# Patient Record
(demographics unavailable — no encounter records)

---

## 2024-10-18 NOTE — PHYSICAL EXAM
[JVD] : no jugular venous distention  [Normal Breath Sounds] : Normal breath sounds [Normal Heart Sounds] : normal heart sounds [Calm] : calm [de-identified] : soft [de-identified] : L shin wound 4 x 4 cm

## 2024-10-18 NOTE — REVIEW OF SYSTEMS
[Fever] : no fever [Chills] : no chills [Chest Pain] : no chest pain [Shortness Of Breath] : no shortness of breath [Abdominal Pain] : no abdominal pain [Arthralgias] : arthralgias [Skin Wound] : skin wound [Convulsions] : no convulsions [Easy Bleeding] : no tendency for easy bleeding [Easy Bruising] : no tendency for easy bruising

## 2024-10-18 NOTE — REASON FOR VISIT
[Consultation] : a consultation visit [Family Member] : family member [FreeTextEntry1] : L shin wound

## 2024-10-25 NOTE — PHYSICAL EXAM
[Normal Breath Sounds] : Normal breath sounds [Normal Heart Sounds] : normal heart sounds [Calm] : calm [de-identified] : soft [de-identified] : L shin: wound is less deep

## 2024-11-11 NOTE — PHYSICAL EXAM
[Normal Breath Sounds] : Normal breath sounds [Normal Heart Sounds] : normal heart sounds [Calm] : calm [de-identified] : soft [de-identified] : L shin skin - otherwise healed.

## 2024-11-11 NOTE — HISTORY OF PRESENT ILLNESS
[de-identified] : Was hospitalized last night. Comes in today from home. Had ddsg on L shin - pulled off healed skin.

## 2024-12-04 NOTE — HISTORY OF PRESENT ILLNESS
[FreeTextEntry1] : The patient is an 86-year-old female presenting for up today.  The patient has a history of paroxysmal atrial fibrillation, CVA s/p ILR, CAD s/p PCI (2016), HTN, HLD, DM, CKD, bi-cytopenia (anemia and thrombocytopenia), and syncope.   To summarize, the patient first had an ILR implanted in 2019 for CVA. This showed AF during follow up and she was started on Eliquis. Given concerns about bleeding, this was later stopped, and she was advised to consider CELY closure which she declined. On 5/12/23 she underwent ILR explant and implant of new MDT ILR device for long term monitoring. She was taken off AC in the past due to bleeding concerns (falls, anemia, thrombocytopenia and CKD). She declined CELY closure in the past.   Today, the patient reports to be doing well. The patient denies any symptoms of palpitations, shortness of breath, dizziness, chest pain, syncope or extremity edema. No AF is seen on remote monitoring of the ILR.

## 2024-12-04 NOTE — DISCUSSION/SUMMARY
[FreeTextEntry1] : In summary, the patient is an 86-year-old female with a history of CVA s/p ILR, CAD, HTN, DM, bicytopenia and very low burden paroxysmal atrial fibrillation. She is off oral AC because of expected high risk of bleeding (falls, bicytopenia). She has declined LAAO in the past. She continues to very little to no AF on ILR monitoring as such it appears reasonable to continue with this strategy. If is detected/increases in burden in the future the discussed for LAAO can be revisited.   f/u in 6 months or sooner if needed. [EKG obtained to assist in diagnosis and management of assessed problem(s)] : EKG obtained to assist in diagnosis and management of assessed problem(s)

## 2025-02-04 NOTE — PHYSICAL EXAM
[General Appearance - Alert] : alert [General Appearance - In No Acute Distress] : in no acute distress [Ankle Swelling (On Exam)] : present [Ankle Swelling Bilaterally] : bilaterally  [] : on both lower extremities [Ankle Swelling On The Right] : mild [Delayed in the Right Toes] : capillary refills normal in right toes [Delayed in the Left Toes] : capillary refills normal in the left toes [de-identified] : wheelchair-dependent due to underlying mental status [Foot Ulcer] : no foot ulcer [FreeTextEntry1] : unable to assess due to likely underlying dementias

## 2025-02-04 NOTE — REASON FOR VISIT
[Initial Visit] : an initial visit for [Formal Caregiver] : formal caregiver [FreeTextEntry2] : foot exam examination

## 2025-02-04 NOTE — HISTORY OF PRESENT ILLNESS
[FreeTextEntry1] : RONAK  is a 86 year old female seen in the [place] office  Patient is present for foot examination. Patient is unable to provide reason she is here for, as per her HHA she is present for her toenails to be clipped  aide states that the family member was supposed to meet in the office but not present.

## 2025-02-04 NOTE — ASSESSMENT
[FreeTextEntry1] : Discussed diagnosis and treatment with patient  Discussed etiology of symptoms patient is experiencing  Aseptic debridement of nails 1-5 bilateral reducing the length with a sterile nail clipper manually and reduced girth by power nery  Rx continue antifungal topical apply to bilateral feet twice a day  Discussed proper shoe gear with patient  Discussed the importance of alternating shoe gear every other day  Discussed the importance of appropriate moisture balance  Discussed with aide to discuss with patient's family members to accompany her for next visits and f/u with PCP for mental status

## 2025-07-28 NOTE — HISTORY OF PRESENT ILLNESS
[de-identified] : 86 year old female here for hearing loss, tinnitus, headaches. Denies otalgia, otorrhea, ear infections, dizziness, vertigo. Denies recent audiogram.

## 2025-07-28 NOTE — PHYSICAL EXAM
[FreeTextEntry9] : wax impaction removed with forceps without complications.  [Normal] : mucosa is normal [Midline] : trachea located in midline position

## 2025-07-28 NOTE — CONSULT LETTER
[Dear  ___] : Dear  [unfilled], [Courtesy Letter:] : I had the pleasure of seeing your patient, [unfilled], in my office today. [Please see my note below.] : Please see my note below. [Sincerely,] : Sincerely, [FreeTextEntry2] : Dr. Nithya Guillen [FreeTextEntry3] : Kei Baeza MD Otolaryngology at [address], [address] [place] [address] Phone: [phone] Fax: [phone]

## 2025-07-28 NOTE — ASSESSMENT
[FreeTextEntry1] : Wax removed.  Check Audiogram. Seek attention for otalgia, otorrhea, bleeding, headache, hearing loss, dizziness or vertigo. Followup 6-12 months